# Patient Record
Sex: FEMALE | Race: WHITE | NOT HISPANIC OR LATINO | Employment: OTHER | URBAN - METROPOLITAN AREA
[De-identification: names, ages, dates, MRNs, and addresses within clinical notes are randomized per-mention and may not be internally consistent; named-entity substitution may affect disease eponyms.]

---

## 2017-04-10 PROBLEM — N39.3 SI (STRESS INCONTINENCE), FEMALE: Status: ACTIVE | Noted: 2017-04-10

## 2017-04-10 PROBLEM — M19.91 PRIMARY OSTEOARTHRITIS: Status: ACTIVE | Noted: 2017-04-10

## 2017-04-10 PROBLEM — Z86.79 ATRIAL FIBRILLATION, CURRENTLY IN SINUS RHYTHM: Status: ACTIVE | Noted: 2017-04-10

## 2017-04-10 PROBLEM — I10 ESSENTIAL HYPERTENSION: Status: ACTIVE | Noted: 2017-04-10

## 2017-04-10 PROBLEM — K57.90 DIVERTICULOSIS OF INTESTINE WITHOUT BLEEDING: Status: ACTIVE | Noted: 2017-04-10

## 2017-04-10 PROBLEM — N95.1 POST MENOPAUSAL SYNDROME: Status: ACTIVE | Noted: 2017-04-10

## 2017-04-10 PROBLEM — K21.9 GASTROESOPHAGEAL REFLUX DISEASE WITHOUT ESOPHAGITIS: Status: ACTIVE | Noted: 2017-04-10

## 2017-05-09 PROBLEM — I48.0 PAROXYSMAL ATRIAL FIBRILLATION: Status: ACTIVE | Noted: 2017-05-09

## 2017-05-09 PROBLEM — E78.5 DYSLIPIDEMIA: Status: ACTIVE | Noted: 2017-05-09

## 2017-06-05 PROBLEM — K57.32 DIVERTICULITIS OF LARGE INTESTINE WITHOUT PERFORATION OR ABSCESS WITHOUT BLEEDING: Status: ACTIVE | Noted: 2017-06-05

## 2017-06-08 PROBLEM — D64.9 ANEMIA: Status: ACTIVE | Noted: 2017-06-08

## 2017-07-03 PROBLEM — S30.1XXA ABDOMINAL WALL SEROMA: Status: ACTIVE | Noted: 2017-07-03

## 2017-10-03 PROBLEM — M17.0 PRIMARY OSTEOARTHRITIS OF BOTH KNEES: Status: ACTIVE | Noted: 2017-10-03

## 2017-10-22 PROBLEM — R52: Status: ACTIVE | Noted: 2017-10-22

## 2017-10-23 PROBLEM — R07.9 CHEST PAIN: Status: ACTIVE | Noted: 2017-10-23

## 2017-10-25 PROBLEM — R07.9 CHEST PAIN: Status: ACTIVE | Noted: 2017-10-25

## 2018-05-02 PROBLEM — M17.12 PRIMARY OSTEOARTHRITIS OF LEFT KNEE: Status: ACTIVE | Noted: 2018-05-02

## 2018-06-27 PROBLEM — R07.9 CHEST PAIN: Status: RESOLVED | Noted: 2017-10-25 | Resolved: 2018-06-27

## 2018-06-27 PROBLEM — Z86.79 S/P ABLATION OF ATRIAL FIBRILLATION: Status: ACTIVE | Noted: 2018-06-27

## 2018-06-27 PROBLEM — Z98.890 S/P ABLATION OF ATRIAL FIBRILLATION: Status: ACTIVE | Noted: 2018-06-27

## 2018-10-16 PROBLEM — M17.11 PRIMARY OSTEOARTHRITIS OF RIGHT KNEE: Status: ACTIVE | Noted: 2018-10-16

## 2019-04-10 PROBLEM — G56.02 CARPAL TUNNEL SYNDROME ON LEFT: Status: ACTIVE | Noted: 2019-04-10

## 2019-04-10 PROBLEM — M65.322 TRIGGER FINGER, LEFT INDEX FINGER: Status: ACTIVE | Noted: 2019-04-10

## 2019-10-29 PROBLEM — I35.0 NONRHEUMATIC AORTIC VALVE STENOSIS: Status: ACTIVE | Noted: 2019-10-29

## 2020-09-17 PROBLEM — I44.2 COMPLETE HEART BLOCK: Status: ACTIVE | Noted: 2020-09-17

## 2020-09-18 ENCOUNTER — LAB VISIT (OUTPATIENT)
Dept: FAMILY MEDICINE | Facility: CLINIC | Age: 75
End: 2020-09-18
Payer: MEDICARE

## 2020-09-18 DIAGNOSIS — Z01.818 PRE-OP EVALUATION: ICD-10-CM

## 2020-09-18 PROCEDURE — U0003 INFECTIOUS AGENT DETECTION BY NUCLEIC ACID (DNA OR RNA); SEVERE ACUTE RESPIRATORY SYNDROME CORONAVIRUS 2 (SARS-COV-2) (CORONAVIRUS DISEASE [COVID-19]), AMPLIFIED PROBE TECHNIQUE, MAKING USE OF HIGH THROUGHPUT TECHNOLOGIES AS DESCRIBED BY CMS-2020-01-R: HCPCS

## 2020-09-19 LAB — SARS-COV-2 RNA RESP QL NAA+PROBE: NOT DETECTED

## 2020-10-20 PROBLEM — Z95.0 CARDIAC PACEMAKER IN SITU: Status: ACTIVE | Noted: 2020-10-20

## 2020-10-20 PROBLEM — I49.5 SSS (SICK SINUS SYNDROME): Status: ACTIVE | Noted: 2020-10-20

## 2021-08-18 ENCOUNTER — TELEPHONE (OUTPATIENT)
Dept: NEUROLOGY | Facility: CLINIC | Age: 76
End: 2021-08-18

## 2021-09-09 PROBLEM — H49.01 3RD NERVE PALSY, COMPLETE, RIGHT: Status: ACTIVE | Noted: 2021-09-09

## 2021-09-20 ENCOUNTER — TELEPHONE (OUTPATIENT)
Dept: NEUROLOGY | Facility: CLINIC | Age: 76
End: 2021-09-20

## 2021-09-20 ENCOUNTER — OFFICE VISIT (OUTPATIENT)
Dept: NEUROLOGY | Facility: CLINIC | Age: 76
End: 2021-09-20
Payer: MEDICARE

## 2021-09-20 VITALS
SYSTOLIC BLOOD PRESSURE: 193 MMHG | WEIGHT: 180.75 LBS | HEIGHT: 64 IN | DIASTOLIC BLOOD PRESSURE: 96 MMHG | HEART RATE: 75 BPM | BODY MASS INDEX: 30.86 KG/M2

## 2021-09-20 DIAGNOSIS — E04.1 THYROID NODULE: ICD-10-CM

## 2021-09-20 DIAGNOSIS — I10 ESSENTIAL HYPERTENSION: ICD-10-CM

## 2021-09-20 DIAGNOSIS — H49.01 3RD NERVE PALSY, COMPLETE, RIGHT: Primary | ICD-10-CM

## 2021-09-20 DIAGNOSIS — I48.0 PAROXYSMAL ATRIAL FIBRILLATION: ICD-10-CM

## 2021-09-20 PROCEDURE — 99999 PR PBB SHADOW E&M-EST. PATIENT-LVL V: CPT | Mod: PBBFAC,,, | Performed by: NURSE PRACTITIONER

## 2021-09-20 PROCEDURE — 1160F RVW MEDS BY RX/DR IN RCRD: CPT | Mod: S$GLB,,, | Performed by: NURSE PRACTITIONER

## 2021-09-20 PROCEDURE — 99205 PR OFFICE/OUTPT VISIT, NEW, LEVL V, 60-74 MIN: ICD-10-PCS | Mod: S$GLB,,, | Performed by: NURSE PRACTITIONER

## 2021-09-20 PROCEDURE — 1160F PR REVIEW ALL MEDS BY PRESCRIBER/CLIN PHARMACIST DOCUMENTED: ICD-10-PCS | Mod: S$GLB,,, | Performed by: NURSE PRACTITIONER

## 2021-09-20 PROCEDURE — 3080F PR MOST RECENT DIASTOLIC BLOOD PRESSURE >= 90 MM HG: ICD-10-PCS | Mod: S$GLB,,, | Performed by: NURSE PRACTITIONER

## 2021-09-20 PROCEDURE — 1126F AMNT PAIN NOTED NONE PRSNT: CPT | Mod: S$GLB,,, | Performed by: NURSE PRACTITIONER

## 2021-09-20 PROCEDURE — 1159F MED LIST DOCD IN RCRD: CPT | Mod: S$GLB,,, | Performed by: NURSE PRACTITIONER

## 2021-09-20 PROCEDURE — 3077F SYST BP >= 140 MM HG: CPT | Mod: S$GLB,,, | Performed by: NURSE PRACTITIONER

## 2021-09-20 PROCEDURE — 3080F DIAST BP >= 90 MM HG: CPT | Mod: S$GLB,,, | Performed by: NURSE PRACTITIONER

## 2021-09-20 PROCEDURE — 99999 PR PBB SHADOW E&M-EST. PATIENT-LVL V: ICD-10-PCS | Mod: PBBFAC,,, | Performed by: NURSE PRACTITIONER

## 2021-09-20 PROCEDURE — 1126F PR PAIN SEVERITY QUANTIFIED, NO PAIN PRESENT: ICD-10-PCS | Mod: S$GLB,,, | Performed by: NURSE PRACTITIONER

## 2021-09-20 PROCEDURE — 99205 OFFICE O/P NEW HI 60 MIN: CPT | Mod: S$GLB,,, | Performed by: NURSE PRACTITIONER

## 2021-09-20 PROCEDURE — 3077F PR MOST RECENT SYSTOLIC BLOOD PRESSURE >= 140 MM HG: ICD-10-PCS | Mod: S$GLB,,, | Performed by: NURSE PRACTITIONER

## 2021-09-20 PROCEDURE — 1159F PR MEDICATION LIST DOCUMENTED IN MEDICAL RECORD: ICD-10-PCS | Mod: S$GLB,,, | Performed by: NURSE PRACTITIONER

## 2021-10-08 ENCOUNTER — TELEPHONE (OUTPATIENT)
Dept: ENDOCRINOLOGY | Facility: CLINIC | Age: 76
End: 2021-10-08

## 2021-11-03 PROBLEM — G56.01 CARPAL TUNNEL SYNDROME ON RIGHT: Status: ACTIVE | Noted: 2021-11-03

## 2022-03-07 ENCOUNTER — OFFICE VISIT (OUTPATIENT)
Dept: ENDOCRINOLOGY | Facility: CLINIC | Age: 77
End: 2022-03-07
Payer: MEDICARE

## 2022-03-07 VITALS
SYSTOLIC BLOOD PRESSURE: 122 MMHG | DIASTOLIC BLOOD PRESSURE: 70 MMHG | BODY MASS INDEX: 31.18 KG/M2 | HEART RATE: 78 BPM | WEIGHT: 176 LBS | HEIGHT: 63 IN | OXYGEN SATURATION: 96 %

## 2022-03-07 DIAGNOSIS — I10 BENIGN ESSENTIAL HTN: Primary | ICD-10-CM

## 2022-03-07 DIAGNOSIS — E04.1 THYROID NODULE: ICD-10-CM

## 2022-03-07 PROCEDURE — 99999 PR PBB SHADOW E&M-EST. PATIENT-LVL V: ICD-10-PCS | Mod: PBBFAC,,, | Performed by: INTERNAL MEDICINE

## 2022-03-07 PROCEDURE — 1159F PR MEDICATION LIST DOCUMENTED IN MEDICAL RECORD: ICD-10-PCS | Mod: CPTII,S$GLB,, | Performed by: INTERNAL MEDICINE

## 2022-03-07 PROCEDURE — 3074F SYST BP LT 130 MM HG: CPT | Mod: CPTII,S$GLB,, | Performed by: INTERNAL MEDICINE

## 2022-03-07 PROCEDURE — 99204 PR OFFICE/OUTPT VISIT, NEW, LEVL IV, 45-59 MIN: ICD-10-PCS | Mod: S$GLB,,, | Performed by: INTERNAL MEDICINE

## 2022-03-07 PROCEDURE — 1101F PR PT FALLS ASSESS DOC 0-1 FALLS W/OUT INJ PAST YR: ICD-10-PCS | Mod: CPTII,S$GLB,, | Performed by: INTERNAL MEDICINE

## 2022-03-07 PROCEDURE — 1159F MED LIST DOCD IN RCRD: CPT | Mod: CPTII,S$GLB,, | Performed by: INTERNAL MEDICINE

## 2022-03-07 PROCEDURE — 3074F PR MOST RECENT SYSTOLIC BLOOD PRESSURE < 130 MM HG: ICD-10-PCS | Mod: CPTII,S$GLB,, | Performed by: INTERNAL MEDICINE

## 2022-03-07 PROCEDURE — 1160F RVW MEDS BY RX/DR IN RCRD: CPT | Mod: CPTII,S$GLB,, | Performed by: INTERNAL MEDICINE

## 2022-03-07 PROCEDURE — 1126F AMNT PAIN NOTED NONE PRSNT: CPT | Mod: CPTII,S$GLB,, | Performed by: INTERNAL MEDICINE

## 2022-03-07 PROCEDURE — 1126F PR PAIN SEVERITY QUANTIFIED, NO PAIN PRESENT: ICD-10-PCS | Mod: CPTII,S$GLB,, | Performed by: INTERNAL MEDICINE

## 2022-03-07 PROCEDURE — 1160F PR REVIEW ALL MEDS BY PRESCRIBER/CLIN PHARMACIST DOCUMENTED: ICD-10-PCS | Mod: CPTII,S$GLB,, | Performed by: INTERNAL MEDICINE

## 2022-03-07 PROCEDURE — 99204 OFFICE O/P NEW MOD 45 MIN: CPT | Mod: S$GLB,,, | Performed by: INTERNAL MEDICINE

## 2022-03-07 PROCEDURE — 1101F PT FALLS ASSESS-DOCD LE1/YR: CPT | Mod: CPTII,S$GLB,, | Performed by: INTERNAL MEDICINE

## 2022-03-07 PROCEDURE — 3078F PR MOST RECENT DIASTOLIC BLOOD PRESSURE < 80 MM HG: ICD-10-PCS | Mod: CPTII,S$GLB,, | Performed by: INTERNAL MEDICINE

## 2022-03-07 PROCEDURE — 99999 PR PBB SHADOW E&M-EST. PATIENT-LVL V: CPT | Mod: PBBFAC,,, | Performed by: INTERNAL MEDICINE

## 2022-03-07 PROCEDURE — 3288F PR FALLS RISK ASSESSMENT DOCUMENTED: ICD-10-PCS | Mod: CPTII,S$GLB,, | Performed by: INTERNAL MEDICINE

## 2022-03-07 PROCEDURE — 3078F DIAST BP <80 MM HG: CPT | Mod: CPTII,S$GLB,, | Performed by: INTERNAL MEDICINE

## 2022-03-07 PROCEDURE — 3288F FALL RISK ASSESSMENT DOCD: CPT | Mod: CPTII,S$GLB,, | Performed by: INTERNAL MEDICINE

## 2022-03-07 NOTE — PROGRESS NOTES
CHIEF COMPLAINT: Thyroid Nodule  76 y.o. old being seen as a new patient. Recently had a CTA in 2021 and found to have a thyroid nodule. NO XRT to head/neck. No difficulty swallowing. No Change in voice. No Hoarseness. Has some palpitations with afib- no worsening. No tremors.       PAST MEDICAL HISTORY/PAST SURGICAL HISTORY:  Reviewed in [facility]    SOCIAL HISTORY: Reviewed in [facility]    FAMILY HISTORY:  Daughter with hypothyroidism. No Thyroid Nodules or thyroid cancer. + DM 2.     MEDICATIONS/ALLERGIES: The patient's MedCard has been updated and reviewed.      ROS:   Constitutional: Weight stable   Cardiovascular: No CP   Respiratory: No SOB  Remainder ROS negative        PE:    GENERAL: Well developed, well nourished.  NECK: Supple, trachea midline, no palpable thyroid nodules. No LAD  CHEST: Resp even and unlabored, CTA bilateral.  CARDIAC: RRR, S1, S2 heard, no murmurs, rubs, S3, or S4  SKIN: no acanthosis nigracans.      LABS/Radiology     Latest Reference Range & Units 10/08/21 11:16   TSH 0.400 - 4.000 uIU/mL 0.866 [1]   T3, Free 2.77 - 5.27 pg/mL 4.33 [2]   Free T4 0.78 - 2.19 ng/dL 1.30 [3]   PTH 9.0 - 77.0 pg/mL 46.4 [4]     CTA Neck 8/18/21  Impression:     1. Calcific atherosclerotic changes of the carotid bifurcations bilaterally without any hemodynamically significant stenosis of the carotid bulbs.  2. 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  3. Intracranial circulation demonstrates no significant stenotic disease or occlusive disease.  4. Hyperenhancing nodule along the posterior aspect of the right lobe of the thyroid, either representing a enhancing thyroid nodule or a lesion like a parathyroid adenoma.  Clinical correlation with blood chemistry suggested.      ASSESSMENT/PLAN:  1. Thyroid Nodule- seen on CT. Will do an Ultrasound to get better imaging characteristics. Discussed nodules and multiple nodules can be common. If meets criteria can do FNA. TSH WNL.      2. HTN- BP controlled.       FOLLOWUP  Thyroid US

## 2022-03-15 ENCOUNTER — HOSPITAL ENCOUNTER (OUTPATIENT)
Dept: RADIOLOGY | Facility: HOSPITAL | Age: 77
Discharge: HOME | End: 2022-03-15
Attending: INTERNAL MEDICINE
Payer: MEDICARE

## 2022-03-15 DIAGNOSIS — E04.1 THYROID NODULE: ICD-10-CM

## 2022-03-15 PROCEDURE — 76536 US EXAM OF HEAD AND NECK: CPT | Mod: TC,PO

## 2022-03-15 PROCEDURE — 76536 US EXAM OF HEAD AND NECK: CPT | Mod: 26,,, | Performed by: RADIOLOGY

## 2022-03-15 PROCEDURE — 76536 US SOFT TISSUE HEAD NECK THYROID: ICD-10-PCS | Mod: 26,,, | Performed by: RADIOLOGY

## 2022-03-17 ENCOUNTER — TELEPHONE (OUTPATIENT)
Dept: ENDOCRINOLOGY | Facility: CLINIC | Age: 77
End: 2022-03-17
Payer: MEDICARE

## 2022-03-17 DIAGNOSIS — E04.1 THYROID NODULE: Primary | ICD-10-CM

## 2022-03-17 NOTE — TELEPHONE ENCOUNTER
Let her know her Ultrasound shows small nodules which are common. Too small to FNA>   Can monitor for now.     F/U 1 year wit TSH, Thyroid US

## 2022-09-29 PROBLEM — I35.0 MODERATE AORTIC VALVE STENOSIS: Status: RESOLVED | Noted: 2019-10-29 | Resolved: 2022-09-29

## 2023-03-15 ENCOUNTER — HOSPITAL ENCOUNTER (OUTPATIENT)
Dept: RADIOLOGY | Facility: HOSPITAL | Age: 78
Discharge: HOME | End: 2023-03-15
Attending: INTERNAL MEDICINE
Payer: MEDICARE

## 2023-03-15 DIAGNOSIS — E04.1 THYROID NODULE: ICD-10-CM

## 2023-03-15 PROCEDURE — 76536 US SOFT TISSUE HEAD NECK THYROID: ICD-10-PCS | Mod: 26,,, | Performed by: RADIOLOGY

## 2023-03-15 PROCEDURE — 76536 US EXAM OF HEAD AND NECK: CPT | Mod: 26,,, | Performed by: RADIOLOGY

## 2023-03-15 PROCEDURE — 76536 US EXAM OF HEAD AND NECK: CPT | Mod: TC,PO

## 2023-03-22 ENCOUNTER — OFFICE VISIT (OUTPATIENT)
Dept: ENDOCRINOLOGY | Facility: CLINIC | Age: 78
End: 2023-03-22
Payer: MEDICARE

## 2023-03-22 VITALS
HEART RATE: 73 BPM | SYSTOLIC BLOOD PRESSURE: 128 MMHG | BODY MASS INDEX: 28.17 KG/M2 | DIASTOLIC BLOOD PRESSURE: 70 MMHG | HEIGHT: 63 IN | OXYGEN SATURATION: 97 % | WEIGHT: 159 LBS

## 2023-03-22 DIAGNOSIS — I10 BENIGN ESSENTIAL HTN: ICD-10-CM

## 2023-03-22 DIAGNOSIS — E04.2 MULTINODULAR GOITER: Primary | ICD-10-CM

## 2023-03-22 PROCEDURE — 1126F PR PAIN SEVERITY QUANTIFIED, NO PAIN PRESENT: ICD-10-PCS | Mod: CPTII,S$GLB,, | Performed by: INTERNAL MEDICINE

## 2023-03-22 PROCEDURE — 3288F PR FALLS RISK ASSESSMENT DOCUMENTED: ICD-10-PCS | Mod: CPTII,S$GLB,, | Performed by: INTERNAL MEDICINE

## 2023-03-22 PROCEDURE — 3078F PR MOST RECENT DIASTOLIC BLOOD PRESSURE < 80 MM HG: ICD-10-PCS | Mod: CPTII,S$GLB,, | Performed by: INTERNAL MEDICINE

## 2023-03-22 PROCEDURE — 99999 PR PBB SHADOW E&M-EST. PATIENT-LVL IV: ICD-10-PCS | Mod: PBBFAC,,, | Performed by: INTERNAL MEDICINE

## 2023-03-22 PROCEDURE — 99213 PR OFFICE/OUTPT VISIT, EST, LEVL III, 20-29 MIN: ICD-10-PCS | Mod: S$GLB,,, | Performed by: INTERNAL MEDICINE

## 2023-03-22 PROCEDURE — 1101F PT FALLS ASSESS-DOCD LE1/YR: CPT | Mod: CPTII,S$GLB,, | Performed by: INTERNAL MEDICINE

## 2023-03-22 PROCEDURE — 1160F RVW MEDS BY RX/DR IN RCRD: CPT | Mod: CPTII,S$GLB,, | Performed by: INTERNAL MEDICINE

## 2023-03-22 PROCEDURE — 99213 OFFICE O/P EST LOW 20 MIN: CPT | Mod: S$GLB,,, | Performed by: INTERNAL MEDICINE

## 2023-03-22 PROCEDURE — 1160F PR REVIEW ALL MEDS BY PRESCRIBER/CLIN PHARMACIST DOCUMENTED: ICD-10-PCS | Mod: CPTII,S$GLB,, | Performed by: INTERNAL MEDICINE

## 2023-03-22 PROCEDURE — 1159F PR MEDICATION LIST DOCUMENTED IN MEDICAL RECORD: ICD-10-PCS | Mod: CPTII,S$GLB,, | Performed by: INTERNAL MEDICINE

## 2023-03-22 PROCEDURE — 1126F AMNT PAIN NOTED NONE PRSNT: CPT | Mod: CPTII,S$GLB,, | Performed by: INTERNAL MEDICINE

## 2023-03-22 PROCEDURE — 3074F SYST BP LT 130 MM HG: CPT | Mod: CPTII,S$GLB,, | Performed by: INTERNAL MEDICINE

## 2023-03-22 PROCEDURE — 99999 PR PBB SHADOW E&M-EST. PATIENT-LVL IV: CPT | Mod: PBBFAC,,, | Performed by: INTERNAL MEDICINE

## 2023-03-22 PROCEDURE — 3288F FALL RISK ASSESSMENT DOCD: CPT | Mod: CPTII,S$GLB,, | Performed by: INTERNAL MEDICINE

## 2023-03-22 PROCEDURE — 3074F PR MOST RECENT SYSTOLIC BLOOD PRESSURE < 130 MM HG: ICD-10-PCS | Mod: CPTII,S$GLB,, | Performed by: INTERNAL MEDICINE

## 2023-03-22 PROCEDURE — 1101F PR PT FALLS ASSESS DOC 0-1 FALLS W/OUT INJ PAST YR: ICD-10-PCS | Mod: CPTII,S$GLB,, | Performed by: INTERNAL MEDICINE

## 2023-03-22 PROCEDURE — 3078F DIAST BP <80 MM HG: CPT | Mod: CPTII,S$GLB,, | Performed by: INTERNAL MEDICINE

## 2023-03-22 PROCEDURE — 1159F MED LIST DOCD IN RCRD: CPT | Mod: CPTII,S$GLB,, | Performed by: INTERNAL MEDICINE

## 2023-03-22 NOTE — PROGRESS NOTES
CHIEF COMPLAINT: Thyroid Nodule  77 y.o. old being seen as a f/u. Recently had a CTA in 2021 and found to have a thyroid nodule. NO XRT to head/neck. No difficulty swallowing. No Change in voice.  Has some palpitations with afib- no worsening. Now on Flecainide due to some SOB that was thought to be due to afib. No CP. No current SOB. No tremors.       PAST MEDICAL HISTORY/PAST SURGICAL HISTORY:  Reviewed in [facility]    SOCIAL HISTORY: Reviewed in [facility]    FAMILY HISTORY:  Daughter with hypothyroidism. No Thyroid Nodules or thyroid cancer. + DM 2.     MEDICATIONS/ALLERGIES: The patient's MedCard has been updated and reviewed.            PE:    GENERAL: Well developed, well nourished.  NECK: Supple, trachea midline, no palpable thyroid nodules. No LAD  CHEST: Resp even and unlabored, CTA bilateral.  CARDIAC: RRR, S1, S2 heard, no murmurs, rubs, S3, or S4      LABS/Radiology       Latest Reference Range & Units 03/15/23 10:23   TSH 0.400 - 4.000 uIU/mL 0.733         US SOFT TISSUE HEAD NECK THYROID     CLINICAL HISTORY:  Nontoxic single thyroid nodule     TECHNIQUE:  Ultrasound of the thyroid and cervical lymph nodes was performed.     COMPARISON:  March 15, 2022     FINDINGS:  Is the right lobe of the thyroid measures 4.5 x 1.6 x 1.5 cm.  The left lobe measures 4.7 x 2.0 x 1.4 cm.  The overall thyroid volume is within normal limits at approximately 13 cc.  A complex cystic versus solid nodule is noted posteriorly in the right thyroid lobe measuring the zapata by 10 x 9 mm.  This is similar to the previous examination.  Multiple smaller nodules are also seen in the right thyroid lobe measuring as large as 4 x 4 x 4 and 4 x 2 x 4 mm.  These are similar to the previous exam.  On the left there is a complex nodule measuring 8 x 5 x 8 mm superiorly which is similar to the previous exam and a 9 x 4 x 8 mm left lower complex nodule.     Impression:     Multinodular thyroid gland with no significant change relative to March 15,  2022.  Continued surveillance is advised.    ASSESSMENT/PLAN:  1. Multinodular goiter-no XRT.  No obstructive symptoms.  Independently reviewed ultrasound images with the patient.  Largest nodule on the right.  Well-defined borders.  No microcalcifications.  She does have several nodules bilaterally and summer mixed solid and cystic.  Showed patient ultrasound images.  At this point can repeat ultrasound in 1 year.    2. HTN- BP controlled.  Can continue current treatment.      FOLLOWUP  F/U 1 year with TSH, thyroid Ultrasound.

## 2023-06-14 PROBLEM — I35.0 NONRHEUMATIC AORTIC VALVE STENOSIS: Status: ACTIVE | Noted: 2023-06-14

## 2023-06-14 PROBLEM — R07.9 CHEST PAIN: Status: RESOLVED | Noted: 2017-10-25 | Resolved: 2023-06-14

## 2023-06-29 PROBLEM — M85.80 OSTEOPENIA: Status: ACTIVE | Noted: 2023-06-29

## 2023-06-29 PROBLEM — I70.0 AORTIC ATHEROSCLEROSIS: Status: ACTIVE | Noted: 2023-06-29

## 2023-08-04 ENCOUNTER — TELEPHONE (OUTPATIENT)
Dept: ORTHOPEDICS | Facility: CLINIC | Age: 78
End: 2023-08-04
Payer: MEDICARE

## 2023-08-04 NOTE — TELEPHONE ENCOUNTER
Referral received from  to see pt for hand swelling. Called pt, she stated she doesn't think she needs to be seen right now as she knows she has arthritis and she cant take a medrol dose pack so she doesn't think there is anything  can do for her at this time. Informed pt to call clinic if she decides she wants to be seen. Pt verbalized understanding.

## 2023-08-08 PROBLEM — I35.0 NONRHEUMATIC AORTIC VALVE STENOSIS: Status: RESOLVED | Noted: 2023-06-14 | Resolved: 2023-08-08

## 2023-08-17 PROBLEM — M19.012 PRIMARY OSTEOARTHRITIS OF LEFT SHOULDER: Status: ACTIVE | Noted: 2023-08-17

## 2023-08-22 PROBLEM — R11.2 NAUSEA AND VOMITING: Status: ACTIVE | Noted: 2023-08-22

## 2023-08-22 PROBLEM — I48.19 PERSISTENT ATRIAL FIBRILLATION: Status: ACTIVE | Noted: 2017-05-09

## 2023-08-22 PROBLEM — Z71.89 ACP (ADVANCE CARE PLANNING): Status: ACTIVE | Noted: 2023-08-22

## 2023-09-13 ENCOUNTER — OFFICE VISIT (OUTPATIENT)
Dept: ORTHOPEDICS | Facility: CLINIC | Age: 78
End: 2023-09-13
Payer: MEDICARE

## 2023-09-13 DIAGNOSIS — M65.332 TRIGGER FINGER, LEFT MIDDLE FINGER: Primary | ICD-10-CM

## 2023-09-13 DIAGNOSIS — M19.041 ARTHRITIS OF FINGER OF RIGHT HAND: ICD-10-CM

## 2023-09-13 PROCEDURE — 99999 PR PBB SHADOW E&M-EST. PATIENT-LVL III: CPT | Mod: PBBFAC,,, | Performed by: ORTHOPAEDIC SURGERY

## 2023-09-13 PROCEDURE — 20550 TENDON SHEATH: ICD-10-PCS | Mod: LT,S$GLB,, | Performed by: ORTHOPAEDIC SURGERY

## 2023-09-13 PROCEDURE — 20600 SMALL JOINT ASPIRATION/INJECTION: R LONG PIP: ICD-10-PCS | Mod: 51,XS,RT,S$GLB | Performed by: ORTHOPAEDIC SURGERY

## 2023-09-13 PROCEDURE — 99999 PR PBB SHADOW E&M-EST. PATIENT-LVL III: ICD-10-PCS | Mod: PBBFAC,,, | Performed by: ORTHOPAEDIC SURGERY

## 2023-09-13 PROCEDURE — 99204 OFFICE O/P NEW MOD 45 MIN: CPT | Mod: 25,S$GLB,, | Performed by: ORTHOPAEDIC SURGERY

## 2023-09-13 PROCEDURE — 20550 NJX 1 TENDON SHEATH/LIGAMENT: CPT | Mod: LT,S$GLB,, | Performed by: ORTHOPAEDIC SURGERY

## 2023-09-13 PROCEDURE — 1159F PR MEDICATION LIST DOCUMENTED IN MEDICAL RECORD: ICD-10-PCS | Mod: CPTII,S$GLB,, | Performed by: ORTHOPAEDIC SURGERY

## 2023-09-13 PROCEDURE — 1101F PR PT FALLS ASSESS DOC 0-1 FALLS W/OUT INJ PAST YR: ICD-10-PCS | Mod: CPTII,S$GLB,, | Performed by: ORTHOPAEDIC SURGERY

## 2023-09-13 PROCEDURE — 1126F PR PAIN SEVERITY QUANTIFIED, NO PAIN PRESENT: ICD-10-PCS | Mod: CPTII,S$GLB,, | Performed by: ORTHOPAEDIC SURGERY

## 2023-09-13 PROCEDURE — 1126F AMNT PAIN NOTED NONE PRSNT: CPT | Mod: CPTII,S$GLB,, | Performed by: ORTHOPAEDIC SURGERY

## 2023-09-13 PROCEDURE — 20600 DRAIN/INJ JOINT/BURSA W/O US: CPT | Mod: 51,XS,RT,S$GLB | Performed by: ORTHOPAEDIC SURGERY

## 2023-09-13 PROCEDURE — 99204 PR OFFICE/OUTPT VISIT, NEW, LEVL IV, 45-59 MIN: ICD-10-PCS | Mod: 25,S$GLB,, | Performed by: ORTHOPAEDIC SURGERY

## 2023-09-13 PROCEDURE — 3288F FALL RISK ASSESSMENT DOCD: CPT | Mod: CPTII,S$GLB,, | Performed by: ORTHOPAEDIC SURGERY

## 2023-09-13 PROCEDURE — 3288F PR FALLS RISK ASSESSMENT DOCUMENTED: ICD-10-PCS | Mod: CPTII,S$GLB,, | Performed by: ORTHOPAEDIC SURGERY

## 2023-09-13 PROCEDURE — 1159F MED LIST DOCD IN RCRD: CPT | Mod: CPTII,S$GLB,, | Performed by: ORTHOPAEDIC SURGERY

## 2023-09-13 PROCEDURE — 1101F PT FALLS ASSESS-DOCD LE1/YR: CPT | Mod: CPTII,S$GLB,, | Performed by: ORTHOPAEDIC SURGERY

## 2023-09-13 RX ADMIN — TRIAMCINOLONE ACETONIDE 40 MG: 40 INJECTION, SUSPENSION INTRA-ARTICULAR; INTRAMUSCULAR at 02:09

## 2023-09-13 NOTE — PROGRESS NOTES
9/13/2023    Chief Complaint:  Chief Complaint   Patient presents with    Right Hand - Pain, Swelling    Left Hand - Pain, Swelling       HPI:  Cheyenne Baldwin is a 77 y.o. female, who presents to clinic today she has a history of bilateral hand pain.  She states that she has had multiple areas of arthritis.  She states that recently her left hand has gotten worse in his swelling.  She did have a shoulder replacement approximately 4 weeks ago.  She is here today for assessment of her hands.    PMHX:  Past Medical History:   Diagnosis Date    3rd nerve palsy, complete     Anticoagulant long-term use     Aortic stenosis, mild     Arthritis     Atrial fibrillation     Diverticulitis     Frequent urination     Frequent UTI     GERD (gastroesophageal reflux disease)     Heart block     History of pneumonia 08/2021    Hypertension     Hypotension, iatrogenic     IBS (irritable bowel syndrome)     Lumbar herniated disc     yrs ago; epidural injections, no problem now    PONV (postoperative nausea and vomiting)     Primary osteoarthritis of left knee 04/2018    Thyroid nodule        PSHX:  Past Surgical History:   Procedure Laterality Date    ANGIOGRAM, CORONARY, WITH LEFT HEART CATHETERIZATION  6/8/2023    Procedure: Left Heart Cath  2123 A;  Surgeon: Rivera Garcia III, MD;  Location: [facility] CATH;  Service: Cardiology;;    APPENDECTOMY      BLADDER REPAIR      BREAST CYST EXCISION      cyst removed    CARDIAC ELECTROPHYSIOLOGY STUDY AND ABLATION  10/2017    CARPAL TUNNEL RELEASE Left 4/10/2019    Procedure: RELEASE, CARPAL TUNNEL;  Surgeon: Jony Ibrahim MD;  Location: [facility] OR;  Service: Orthopedics;  Laterality: Left;    CARPAL TUNNEL RELEASE Right 11/3/2021    Procedure: RELEASE, CARPAL TUNNEL;  Surgeon: Jony Ibrahim MD;  Location: [facility] OR;  Service: Orthopedics;  Laterality: Right;    CATARACT EXTRACTION EXTRACAPSULAR W/ INTRAOCULAR LENS IMPLANTATION Bilateral     CHOLECYSTECTOMY      COLON SURGERY      CORONARY  ANGIOGRAPHY  6/8/2023    Procedure: ANGIOGRAM, CORONARY ARTERY;  Surgeon: Rivera Garcia III, MD;  Location: [facility] CATH;  Service: Cardiology;;    EYE SURGERY      cataract    HEMORRHOID SURGERY      HERNIA REPAIR      HYSTERECTOMY  1966    INSERTION OF PACEMAKER N/A 9/21/2020    Procedure: INSERTION, PACEMAKER-BIOTRONIK;  Surgeon: Rivera Garcia III, MD;  Location: [facility] CATH;  Service: Cardiology;  Laterality: N/A;    JOINT REPLACEMENT Left 05/02/2018    TKA    JOINT REPLACEMENT Right 10/16/2018    TKA    KNEE ARTHROPLASTY Right 10/16/2018    Procedure: ARTHROPLASTY, KNEE;  Surgeon: Jony Ibrahim MD;  Location: [facility] OR;  Service: Orthopedics;  Laterality: Right;    LAPAROSCOPIC NISSEN FUNDOPLICATION      LOOP RECORDER      REMOVAL OF IMPLANTABLE LOOP RECORDER  9/21/2020    Procedure: REMOVAL, IMPLANTABLE LOOP RECORDER;  Surgeon: Rivera Garcia III, MD;  Location: [facility] CATH;  Service: Cardiology;;    REVERSE TOTAL SHOULDER ARTHROPLASTY Left 8/17/2023    Procedure: ARTHROPLASTY, SHOULDER, TOTAL, REVERSE- DJO;  Surgeon: Abdullahi Douglass II, MD;  Location: [facility] OR;  Service: Orthopedics;  Laterality: Left;    SIGMOIDECTOMY  06/07/2017    TRIGGER FINGER RELEASE Left 4/10/2019    Procedure: RELEASE, TRIGGER FINGER - Index;  Surgeon: Jony Ibrahim MD;  Location: [facility] OR;  Service: Orthopedics;  Laterality: Left;       FMHX:  Family History   Problem Relation Age of Onset    Diabetes Mother     Heart disease Mother     Heart attack Mother 53    Early death Mother 53        MI    Cancer Father         lung    Heart disease Father     Heart attack Brother     Early death Brother 53        mi    Breast cancer Paternal Aunt     Early death Brother     Heart attack Brother        SOCHX:  Social History     Tobacco Use    Smoking status: Former     Current packs/day: 0.00     Average packs/day: 1.5 packs/day for 25.0 years (37.5 ttl pk-yrs)     Types: Cigarettes     Start date: 6/2/1958     Quit date: 6/2/1983     Years  since quittin.3    Smokeless tobacco: Never   Substance Use Topics    Alcohol use: Yes     Alcohol/week: 1.0 standard drink of alcohol     Types: 1 Cans of beer per week       ALLERGIES:  Amiodarone analogues, Lisinopril, Arthrotec 50 [diclofenac-misoprostol], Corticosteroids (glucocorticoids), and Pravastatin    CURRENT MEDICATIONS:  Current Outpatient Medications on File Prior to Visit   Medication Sig Dispense Refill    acetaminophen (TYLENOL) 325 MG tablet Take 2 tablets (650 mg total) by mouth every 6 (six) hours. 56 tablet 0    albuterol (PROVENTIL/VENTOLIN HFA) 90 mcg/actuation inhaler Inhale 2 puffs into the lungs every 6 (six) hours as needed for Wheezing. Rescue      apixaban (ELIQUIS) 5 mg Tab Take 1 tablet (5 mg total) by mouth 2 (two) times daily. 180 tablet 3    carvediloL (COREG) 12.5 MG tablet Take 1 tablet (12.5 mg total) by mouth 2 (two) times daily with meals. 180 tablet 3    cetirizine (ZYRTEC) 10 MG tablet Take 10 mg by mouth once daily.      cholecalciferol, vitamin D3, (VITAMIN D3) 50 mcg (2,000 unit) Cap Take 1 capsule by mouth once daily.      cloNIDine (CATAPRES) 0.1 MG tablet Take 0.1 mg by mouth every 12 (twelve) hours as needed.      CRANBERRY FRUIT EXTRACT (CRANBERRY CONCENTRATE ORAL) Take 1 tablet by mouth once daily.      docusate sodium (COLACE) 100 MG capsule Take 1 capsule (100 mg total) by mouth 2 (two) times daily. (Patient taking differently: Take 100 mg by mouth nightly.) 30 capsule 0    famotidine (PEPCID) 20 MG tablet Take 1 tablet (20 mg total) by mouth every evening. (Patient taking differently: Take 20 mg by mouth nightly as needed for Heartburn.) 90 tablet 3    flecainide (TAMBOCOR) 100 MG Tab Take 1 tablet (100 mg total) by mouth every 12 (twelve) hours. 180 tablet 3    fluticasone propionate (FLONASE) 50 mcg/actuation nasal spray 1 spray (50 mcg total) by Each Nostril route 2 (two) times daily as needed for Rhinitis or Allergies. 48 g 3    furosemide (LASIX) 20 MG  tablet Take 1 tablet (20 mg total) by mouth once daily. 90 tablet 3    LIDOcaine (LIDODERM) 5 % Place 1 patch onto the skin once daily. Remove & Discard patch within 12 hours or as directed by MD 12 patch 0    nitroGLYCERIN (NITROSTAT) 0.4 MG SL tablet Place 1 tablet (0.4 mg total) under the tongue every 5 (five) minutes as needed for Chest pain. 30 tablet 0    ondansetron (ZOFRAN-ODT) 4 MG TbDL Take 1 tablet (4 mg total) by mouth every 6 (six) hours as needed (nausea or vomiting). 20 tablet 0    oxyCODONE (ROXICODONE) 10 mg Tab immediate release tablet Take 1 tablet (10 mg total) by mouth every 4 (four) hours as needed. 30 tablet 0    pantoprazole (PROTONIX) 40 MG tablet Take 1 tablet (40 mg total) by mouth once daily. 90 tablet 3    potassium chloride SA (K-DUR,KLOR-CON M) 10 MEQ tablet Take 1 tablet (10 mEq total) by mouth once daily. When taking fluid pill 90 tablet 3    traMADoL (ULTRAM) 50 mg tablet Take 1 tablet (50 mg total) by mouth every 8 (eight) hours as needed for Pain. 21 tablet 0    verapamiL (VERELAN) 120 MG C24P Take 1 capsule (120 mg total) by mouth once daily. 30 capsule 11     No current facility-administered medications on file prior to visit.       REVIEW OF SYSTEMS:  Review of Systems   Constitutional: Negative.    HENT: Negative.     Eyes: Negative.    Respiratory: Negative.     Cardiovascular: Negative.    Gastrointestinal: Negative.    Genitourinary: Negative.    Musculoskeletal:  Positive for joint pain.   Skin: Negative.    Neurological: Negative.    Endo/Heme/Allergies: Negative.    Psychiatric/Behavioral: Negative.       GENERAL PHYSICAL EXAM:   There were no vitals taken for this visit.   GEN: well developed, well nourished, no acute distress   HENT: Normocephalic, atraumatic   EYES: No discharge, conjunctiva normal   NECK: Supple, non-tender   PULM: No wheezing, no respiratory distress   CV: RRR   ABD: Soft, non-tender    ORTHO EXAM:   Examination of bilateral hands reveals that there  is mild edema to the left hand.  There are no major skin changes to either hand.  Palpation does produce tenderness over the right middle finger PIP joint.  There is also significant tenderness over the left middle finger A1 pulley.  Flexion and extension of both hands reveals stiffness but more so on the left middle finger.  There is some triggering noted.  She does report grossly intact sensation and capillary refill is less than 2 seconds    RADIOLOGY:   X-rays of bilateral hands from July has been reviewed.  She was noted have multiple areas of degenerative change.    ASSESSMENT:   Right middle finger PIP arthritis, left middle finger triggering, bilateral hand arthritis    PLAN:  1. I have discussed treatment with the patient.  She states that she is unable to take anti-inflammatories.  I did discuss point it steroid injections versus surgical treatments.  She would like to consider some injections    2.  After informed consent was obtained injection was placed to the right middle finger PIP joint and the left middle finger flexor tendon sheath.  The patient tolerated both these injections well    3.  She will follow up with me on an as-needed basis

## 2023-09-20 RX ORDER — TRIAMCINOLONE ACETONIDE 40 MG/ML
40 INJECTION, SUSPENSION INTRA-ARTICULAR; INTRAMUSCULAR
Status: DISCONTINUED | OUTPATIENT
Start: 2023-09-13 | End: 2023-09-21 | Stop reason: HOSPADM

## 2023-09-21 NOTE — PROCEDURES
Tendon Sheath    Date/Time: 9/13/2023 2:20 PM    Performed by: Juan J Wall MD  Authorized by: Juan J Wall MD    Consent Done?:  Yes (Verbal)  Indications:  Pain  Site marked: the procedure site was marked    Timeout: prior to procedure the correct patient, procedure, and site was verified    Prep: patient was prepped and draped in usual sterile fashion      Local anesthetic:  Lidocaine 1% without epinephrine  Anesthetic total (ml):  0.5    Location:  Long finger  Site:  L long flexor tendon sheath  Needle size:  25 G  Medications:  40 mg triamcinolone acetonide 40 mg/mL (20mg injected)  Patient tolerance:  Patient tolerated the procedure well with no immediate complications

## 2023-09-21 NOTE — PROCEDURES
Small Joint Aspiration/Injection: R long PIP    Date/Time: 9/13/2023 2:20 PM    Performed by: Juan J Wall MD  Authorized by: Juan J Wall MD    Consent Done?:  Yes (Verbal)  Indications:  Pain  Site marked: the procedure site was marked    Timeout: prior to procedure the correct patient, procedure, and site was verified    Prep: patient was prepped and draped in usual sterile fashion      Location:  Long finger  Site:  R long PIP  Needle size:  25 G  Medications:  40 mg triamcinolone acetonide 40 mg/mL  Patient tolerance:  Patient tolerated the procedure well with no immediate complications

## 2024-02-26 ENCOUNTER — HOSPITAL ENCOUNTER (OUTPATIENT)
Dept: RADIOLOGY | Facility: HOSPITAL | Age: 79
Discharge: HOME | End: 2024-02-26
Attending: INTERNAL MEDICINE
Payer: MEDICARE

## 2024-02-26 DIAGNOSIS — E04.2 MULTINODULAR GOITER: ICD-10-CM

## 2024-02-26 PROCEDURE — 76536 US EXAM OF HEAD AND NECK: CPT | Mod: TC,PO

## 2024-02-26 PROCEDURE — 76536 US EXAM OF HEAD AND NECK: CPT | Mod: 26,,, | Performed by: RADIOLOGY

## 2024-03-04 ENCOUNTER — OFFICE VISIT (OUTPATIENT)
Dept: ENDOCRINOLOGY | Facility: CLINIC | Age: 79
End: 2024-03-04
Payer: MEDICARE

## 2024-03-04 VITALS
DIASTOLIC BLOOD PRESSURE: 80 MMHG | WEIGHT: 169.75 LBS | HEART RATE: 94 BPM | OXYGEN SATURATION: 97 % | HEIGHT: 63 IN | SYSTOLIC BLOOD PRESSURE: 134 MMHG | BODY MASS INDEX: 30.08 KG/M2

## 2024-03-04 DIAGNOSIS — E04.2 MULTINODULAR GOITER: Primary | ICD-10-CM

## 2024-03-04 DIAGNOSIS — I10 BENIGN ESSENTIAL HTN: ICD-10-CM

## 2024-03-04 PROCEDURE — 1126F AMNT PAIN NOTED NONE PRSNT: CPT | Mod: CPTII,S$GLB,, | Performed by: INTERNAL MEDICINE

## 2024-03-04 PROCEDURE — 1160F RVW MEDS BY RX/DR IN RCRD: CPT | Mod: CPTII,S$GLB,, | Performed by: INTERNAL MEDICINE

## 2024-03-04 PROCEDURE — 99999 PR PBB SHADOW E&M-EST. PATIENT-LVL III: CPT | Mod: PBBFAC,,, | Performed by: INTERNAL MEDICINE

## 2024-03-04 PROCEDURE — 1159F MED LIST DOCD IN RCRD: CPT | Mod: CPTII,S$GLB,, | Performed by: INTERNAL MEDICINE

## 2024-03-04 PROCEDURE — 1101F PT FALLS ASSESS-DOCD LE1/YR: CPT | Mod: CPTII,S$GLB,, | Performed by: INTERNAL MEDICINE

## 2024-03-04 PROCEDURE — 3075F SYST BP GE 130 - 139MM HG: CPT | Mod: CPTII,S$GLB,, | Performed by: INTERNAL MEDICINE

## 2024-03-04 PROCEDURE — 3079F DIAST BP 80-89 MM HG: CPT | Mod: CPTII,S$GLB,, | Performed by: INTERNAL MEDICINE

## 2024-03-04 PROCEDURE — 3288F FALL RISK ASSESSMENT DOCD: CPT | Mod: CPTII,S$GLB,, | Performed by: INTERNAL MEDICINE

## 2024-03-04 PROCEDURE — 99214 OFFICE O/P EST MOD 30 MIN: CPT | Mod: S$GLB,,, | Performed by: INTERNAL MEDICINE

## 2024-03-04 NOTE — PROGRESS NOTES
CHIEF COMPLAINT: Thyroid Nodule  78 y.o. old being seen as a f/u. Recently had a CTA in 2021 and found to have a thyroid nodule. NO XRT to head/neck. No difficulty swallowing. No Change in voice.  No worsening palpitations.       PAST MEDICAL HISTORY/PAST SURGICAL HISTORY:  Reviewed in [facility]    SOCIAL HISTORY: Reviewed in [facility]    FAMILY HISTORY:  Daughter with hypothyroidism. No Thyroid Nodules or thyroid cancer. + DM 2.     MEDICATIONS/ALLERGIES: The patient's MedCard has been updated and reviewed.            PE:    GENERAL: Well developed, well nourished.  NECK: Supple, trachea midline, no palpable thyroid nodules. No LAD  CHEST: Resp even and unlabored, CTA bilateral.  CARDIAC: RRR, S1, S2 heard, no murmurs, rubs, S3, or S4      LABS/Radiology     Latest Reference Range & Units 02/26/24 10:56   TSH 0.400 - 4.000 uIU/mL 0.746       US THYROID     CLINICAL HISTORY:  Nontoxic multinodular goiter     TECHNIQUE:  Ultrasound of the thyroid and cervical lymph nodes was performed.     COMPARISON:  03/15/2023     FINDINGS:  The right lobe of the thyroid measures 5.0 x 1.4 x 1.6 cm the left lobe measures 4.6 x 1.5 x 1.3 cm.  There are multiple thyroid nodules the largest of which measures 1.1 cm.  No nodules are seen to contain microcalcifications.     Impression:     There are no nodules meeting criteria for FNA recommendation.    ASSESSMENT/PLAN:  1. Multinodular goiter-no XRT.  No obstructive symptoms.  Independently reviewed ultrasound images with the patient and her .  Nodules have well-defined borders.  No microcalcifications.  Nodules have been relatively stable since 2022.  Considering risk of surgery as one gets older as well as stable nodules, I think at this point okay to stop following with ultrasound.  Okay to follow up with PCP.  Can get thyroid manually palpated with physical exam at annual wellness visit.  Please re-consult as needed.    2. HTN- BP controlled.  Can continue current  treatment.      FOLLOWUP  AVS- Can f/u with PCP

## 2024-04-03 PROBLEM — M19.011 PRIMARY OSTEOARTHRITIS, RIGHT SHOULDER: Status: ACTIVE | Noted: 2024-04-03

## 2024-05-21 PROBLEM — T81.89XA SUTURE GRANULOMA: Status: ACTIVE | Noted: 2024-05-21

## 2024-05-21 PROBLEM — Z96.611 STATUS POST REVERSE ARTHROPLASTY OF RIGHT SHOULDER: Status: ACTIVE | Noted: 2024-05-21
